# Patient Record
Sex: FEMALE | Race: OTHER
[De-identification: names, ages, dates, MRNs, and addresses within clinical notes are randomized per-mention and may not be internally consistent; named-entity substitution may affect disease eponyms.]

---

## 2020-07-19 ENCOUNTER — HOSPITAL ENCOUNTER (EMERGENCY)
Dept: HOSPITAL 62 - ER | Age: 20
LOS: 1 days | Discharge: HOME | End: 2020-07-20
Payer: OTHER GOVERNMENT

## 2020-07-19 DIAGNOSIS — R10.2: Primary | ICD-10-CM

## 2020-07-19 PROCEDURE — 87591 N.GONORRHOEAE DNA AMP PROB: CPT

## 2020-07-19 PROCEDURE — 87250 VIRUS INOCULATE EGGS/ANIMAL: CPT

## 2020-07-19 PROCEDURE — 87210 SMEAR WET MOUNT SALINE/INK: CPT

## 2020-07-19 PROCEDURE — 36415 COLL VENOUS BLD VENIPUNCTURE: CPT

## 2020-07-19 PROCEDURE — 99283 EMERGENCY DEPT VISIT LOW MDM: CPT

## 2020-07-19 PROCEDURE — 87491 CHLMYD TRACH DNA AMP PROBE: CPT

## 2020-07-19 PROCEDURE — 81001 URINALYSIS AUTO W/SCOPE: CPT

## 2020-07-19 PROCEDURE — 96372 THER/PROPH/DIAG INJ SC/IM: CPT

## 2020-07-20 VITALS — SYSTOLIC BLOOD PRESSURE: 102 MMHG | DIASTOLIC BLOOD PRESSURE: 59 MMHG

## 2020-07-20 LAB
APPEARANCE UR: (no result)
APTT PPP: YELLOW S
BILIRUB UR QL STRIP: NEGATIVE
CHLAM PCR: NOT DETECTED
GLUCOSE UR STRIP-MCNC: NEGATIVE MG/DL
KETONES UR STRIP-MCNC: NEGATIVE MG/DL
NITRITE UR QL STRIP: NEGATIVE
PH UR STRIP: 5 [PH] (ref 5–9)
PROT UR STRIP-MCNC: NEGATIVE MG/DL
RBCS (WET MOUNT): (no result)
SP GR UR STRIP: 1.03
T.VAGINALIS (WET MOUNT): (no result)
UROBILINOGEN UR-MCNC: 2 MG/DL (ref ?–2)
WBCS (WET MOUNT): (no result)
YEAST (WET MOUNT): (no result)

## 2020-07-20 NOTE — ER DOCUMENT REPORT
ED GI/





- General


Chief Complaint: Vaginal Pain


Stated Complaint: VAGINAL ISSUES/PAIN


Time Seen by Provider: 07/20/20 03:18


Mode of Arrival: Ambulatory


Information source: Patient


Notes: 





Otherwise healthy 19-year-old female presenting to the emergency department with

chief complaint of vaginal pain.  Patient reports for the last few days she has 

had sores that are tender in the vaginal area.  She denies any dysuria, urinary 

frequency or abnormal vaginal discharge.  She denies concern for STDs.








- Related Data


Allergies/Adverse Reactions: 


                                        





No Known Allergies Allergy (Unverified 07/20/20 01:03)


   











Past Medical History





- General


Information source: Patient


Last Menstrual Period: 7/6/20





- Social History


Smoking Status: Never Smoker


Frequency of alcohol use: None


Drug Abuse: None


Family History: None





- Medical History


Medical History: Negative





Review of Systems





- Review of Systems


Female Genitourinary: Other - Vaginal pain


Skin: Lesions - Vaginal


-: Yes All other systems reviewed and negative





Physical Exam





- Vital signs


Vitals: 


                                        











Temp Pulse Resp BP Pulse Ox


 


 97.6 F   67   16   110/57 L  99 


 


 07/19/20 23:19  07/19/20 23:19  07/19/20 23:19  07/19/20 23:19  07/19/20 23:19














- Notes


Notes: 





PHYSICAL EXAMINATION:





GENERAL: Well-appearing, well-nourished and in no acute distress.





HEAD: Atraumatic, normocephalic.





EYES: Pupils equal round and reactive to light, extraocular movements intact, 

conjunctiva are normal.





ENT: Nares patent, oropharynx clear without exudates.  Moist mucous membranes.





NECK: Normal range of motion, supple without lymphadenopathy





LUNGS: Breath sounds clear to auscultation bilaterally and equal.  No wheezes 

rales or rhonchi.





HEART: Regular rate and rhythm without murmurs





ABDOMEN: Soft, nontender, nondistended abdomen.  No guarding, no rebound.  No 

masses appreciated.





Female : Few scattered sores on outside of vagina on labia majora, speculum 

exam shows thick yellowish vaginal discharge.  No cervical motion tenderness or 

adnexal tenderness.





Musculoskeletal: Normal range of motion, no pitting or edema.  No cyanosis.





NEUROLOGICAL: Cranial nerves grossly intact.  Normal speech, normal gait.  

Normal sensory, motor exams





PSYCH: Normal mood, normal affect.





SKIN: Warm, Dry, normal turgor, no rashes or lesions noted.





Course





- Re-evaluation


Re-evalutation: 





Patient's significant other was in the room at the time of initial evaluation.  

He was asked to step out to the lobby due to the nature of patient's concerns.  

Patient reports vaginal source for the last 3 days.  She denies any history of 

same.  Denies any history of herpes.  She does not believe that STDs are a 

concern although she would like to have prophylactic treatment pending her 

chlamydia and gonorrhea testing.  The sores on the vagina do not overwhelmingly 

appear to be herpes although I cannot rule this out with a herpes swab was 

obtained.  Patient was counseled on this test.  She was notified that we will 

contact her via phone with any abnormal test results.  Patient is agreeable to 

this plan and understands ED return precautions.





- Vital Signs


Vital signs: 


                                        











Temp Pulse Resp BP Pulse Ox


 


 98.2 F   58 L  18   102/59 L  100 


 


 07/20/20 05:56  07/20/20 05:56  07/20/20 05:56  07/20/20 05:56  07/20/20 05:56














- Laboratory


Laboratory results interpreted by me: 


                                        











  07/20/20





  01:13


 


Urine Urobilinogen  2.0 H


 


Urine Ascorbic Acid  40 H














Discharge





- Discharge


Clinical Impression: 


 Vaginal pain





Condition: Stable


Disposition: HOME, SELF-CARE


Additional Instructions: 


The labs that came back today were normal.  A few of your tests are pending.  We

will call you if there is any abnormalities.  Please apply medication as 

prescribed as this may help with your discomfort.  Call and make an appointment 

with either OB/GYN or the health department for follow-up.





Prescriptions: 


Benzocaine/Menthol [Dermoplast Aerosol Spray 56 ml] 1 spray TP QID #1 aerosol